# Patient Record
Sex: FEMALE | Race: WHITE | Employment: FULL TIME | URBAN - METROPOLITAN AREA
[De-identification: names, ages, dates, MRNs, and addresses within clinical notes are randomized per-mention and may not be internally consistent; named-entity substitution may affect disease eponyms.]

---

## 2018-02-07 ENCOUNTER — OFFICE VISIT (OUTPATIENT)
Dept: FAMILY MEDICINE | Facility: CLINIC | Age: 58
End: 2018-02-07
Payer: COMMERCIAL

## 2018-02-07 VITALS
HEART RATE: 76 BPM | WEIGHT: 125 LBS | OXYGEN SATURATION: 100 % | DIASTOLIC BLOOD PRESSURE: 76 MMHG | TEMPERATURE: 97.1 F | SYSTOLIC BLOOD PRESSURE: 114 MMHG | RESPIRATION RATE: 18 BRPM
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
## 2018-02-07 PROCEDURE — 99203 OFFICE O/P NEW LOW 30 MIN: CPT | Performed by: FAMILY MEDICINE

## 2018-02-07 NOTE — MR AVS SNAPSHOT
After Visit Summary   2/7/2018    Lizzie Monson    MRN: [id number]           Patient Information     Date Of Birth          1960        Visit Information        Provider Department      2/7/2018 3:00 PM Viridiana Philippe MD [facility]        Today's Diagnoses     Dog bite, initial encounter    -  1      Care Instructions      Animal Bite (General)  An animal bite can cause a wound deep enough to break the skin. In such cases, the wound is cleaned and then sometimes closed. If the wound is closed, it may not be closed completely. This is so that fluid can drain if the wound becomes infected. In addition to wound care, a tetanus shot may be given, if needed.    Home care    Care for the wound as directed. If a dressing was applied to the wound, be sure to change it as directed.    Wash your hands well with soap and warm water before and after caring for the wound. This helps lower the risk of infection.    If the wound bleeds, place a clean, soft cloth on the wound. Then firmly apply pressure until the bleeding stops. This may take up to 5 minutes. Do not release the pressure and look at the wound during this time.    Most skin wounds heal within 10 days. But an infection can occur even with proper treatment. So be sure to watch the wound for signs of infection (see below). Check the wound as often as directed by your healthcare provider.    Antibiotics may be prescribed. These help prevent or treat infection. If you re given antibiotics, take them as directed. Also be sure to complete the medicines.  Rabies prevention  Rabies is a virus that can be carried in certain animals. These can include domestic animals such as dogs and cats. Wild animals such as skunks, raccoons, foxes, and bats can also carry rabies. Pets fully vaccinated against rabies (2 shots) are at very low risk of infection. But because human rabies is almost always fatal, any biting pet should be confined for 10  days as an extra precaution. In general, if there is a risk for rabies, the following steps may need to be taken:    If someone s pet dog or cat has bitten you, it should be kept in a secure area for the next 10 days to watch for signs of illness. (If the pet owner won t allow this, contact your local animal control center.) If the dog or cat becomes ill or dies during that time, contact your local animal control center at once so the animal may be tested for rabies. If the pet stays healthy for the next 10 days, there is no danger of rabies in the animal or you.    If a stray pet bit you, contact your local animal control center. They can give information on capture, quarantine, and animal rabies testing.    If you can t find the animal that bit you in the next 2 days, and if rabies exists in your region, you may need to receive the rabies vaccine series. Call your healthcare provider right away. Or return to the emergency department promptly.    All animal bites should be reported to the local animal control center. If you were not given a form to fill out, you can report this yourself.  Follow-up care  Follow up with your healthcare provider, or as directed.  When to seek medical advice  Call your healthcare provider right away if any of these occur:    Signs of infection:    Spreading redness or warmth from the wound    Increased pain or swelling    Fever of 100.4 F (38 C) or higher, or as directed by your healthcare provider    Colored fluid or pus draining from the wound    Signs of rabies infection:    Headache    Confusion    Strange behavior    Increased salivating and drooling    Seizure    Decreased ability to move any body part near the bite area    Bleeding that can't be stopped after 5 minutes of firm pressure  Date Last Reviewed: 3/1/2017    7688-4806 [employer]. [address] [place] [address], [place] [address]. All rights reserved. This information is not intended as a substitute for  "professional medical care. Always follow your healthcare professional's instructions.                Follow-ups after your visit        Who to contact     If you have questions or need follow up information about today's clinic visit or your schedule please contact [facility] directly at [phone].  Normal or non-critical lab and imaging results will be communicated to you by MyChart, letter or phone within 4 business days after the clinic has received the results. If you do not hear from us within 7 days, please contact the clinic through MyChart or phone. If you have a critical or abnormal lab result, we will notify you by phone as soon as possible.  Submit refill requests through [employer] or call your pharmacy and they will forward the refill request to us. Please allow 3 business days for your refill to be completed.          Additional Information About Your Visit        MyChart Information     [employer]beat lets you send messages to your doctor, view your test results, renew your prescriptions, schedule appointments and more. To sign up, go to [email or web address] . Click on \"Log in\" on the left side of the screen, which will take you to the Welcome page. Then click on \"Sign up Now\" on the right side of the page.     You will be asked to enter the access code listed below, as well as some personal information. Please follow the directions to create your username and password.     Your access code is: [email or web address]  Expires: 2018  3:35 PM     Your access code will  in 90 days. If you need help or a new code, please call your [facility] or [phone].        Care EveryWhere ID     This is your Care EveryWhere ID. This could be used by other organizations to access your [place] medical records  [id number]        Your Vitals Were     Pulse Temperature Respirations Pulse Oximetry          76 97.1  F (36.2  C) (Oral) 18 100%         Blood Pressure from Last 3 Encounters: "   02/07/18 114/76   11/26/12 110/64    Weight from Last 3 Encounters:   02/07/18 125 lb (56.7 kg)   11/26/12 124 lb (56.2 kg)              Today, you had the following     No orders found for display         Today's Medication Changes          These changes are accurate as of 2/7/18  3:35 PM.  If you have any questions, ask your nurse or doctor.               Start taking these medicines.        Dose/Directions    amoxicillin-clavulanate 875-125 MG per tablet   Commonly known as:  AUGMENTIN   Used for:  Dog bite, initial encounter   Started by:  Viridiana Philippe MD        Dose:  1 tablet   Take 1 tablet by mouth 2 times daily for 5 days   Quantity:  10 tablet   Refills:  0            Where to get your medicines      These medications were sent to [facility] [id number] - [place], [place] - [address] [facility]  [place]  [address] [place] [address] [place], [place] [address]    Hours:  24-hours Phone:  [phone]     amoxicillin-clavulanate 875-125 MG per tablet                Primary Care Provider Fax #    Physician No Ref-Primary [phone]       No address on file        Equal Access to Services     SWATI BLUM : Hadii vera manriqueo Soporfirio, waaxda luqadaha, qaybta kaalmada adeegyada, richard almanzar. [facility] [phone].    ATENCIÓN: Si habla español, tiene a gamino disposición servicios gratuitos de asistencia lingüística. Jose al [phone].    We comply with applicable federal civil rights laws and [place] laws. We do not discriminate on the basis of race, color, national origin, age, disability, sex, sexual orientation, or gender identity.            Thank you!     Thank you for choosing [facility]  for your care. Our goal is always to provide you with excellent care. Hearing back from our patients is one way we can continue to improve our services. Please take a few minutes to complete the written survey that you may receive in the mail after  your visit with us. Thank you!             Your Updated Medication List - Protect others around you: Learn how to safely use, store and throw away your medicines at www.disposemymeds.org.          This list is accurate as of 2/7/18  3:35 PM.  Always use your most recent med list.                   Brand Name Dispense Instructions for use Diagnosis    amoxicillin-clavulanate 875-125 MG per tablet    AUGMENTIN    10 tablet    Take 1 tablet by mouth 2 times daily for 5 days    Dog bite, initial encounter       azithromycin 250 MG tablet    ZITHROMAX    6 tablet    Two tablets first day, then one tablet daily for four days.    Maxillary sinusitis       fluticasone 50 MCG/ACT spray    FLONASE    1 Package    Spray 1-2 sprays into both nostrils daily.    Maxillary sinusitis       OMEPRAZOLE PO      Take 1 tablet by mouth daily.

## 2018-02-07 NOTE — PATIENT INSTRUCTIONS
Animal Bite (General)  An animal bite can cause a wound deep enough to break the skin. In such cases, the wound is cleaned and then sometimes closed. If the wound is closed, it may not be closed completely. This is so that fluid can drain if the wound becomes infected. In addition to wound care, a tetanus shot may be given, if needed.    Home care    Care for the wound as directed. If a dressing was applied to the wound, be sure to change it as directed.    Wash your hands well with soap and warm water before and after caring for the wound. This helps lower the risk of infection.    If the wound bleeds, place a clean, soft cloth on the wound. Then firmly apply pressure until the bleeding stops. This may take up to 5 minutes. Do not release the pressure and look at the wound during this time.    Most skin wounds heal within 10 days. But an infection can occur even with proper treatment. So be sure to watch the wound for signs of infection (see below). Check the wound as often as directed by your healthcare provider.    Antibiotics may be prescribed. These help prevent or treat infection. If you re given antibiotics, take them as directed. Also be sure to complete the medicines.  Rabies prevention  Rabies is a virus that can be carried in certain animals. These can include domestic animals such as dogs and cats. Wild animals such as skunks, raccoons, foxes, and bats can also carry rabies. Pets fully vaccinated against rabies (2 shots) are at very low risk of infection. But because human rabies is almost always fatal, any biting pet should be confined for 10 days as an extra precaution. In general, if there is a risk for rabies, the following steps may need to be taken:    If someone s pet dog or cat has bitten you, it should be kept in a secure area for the next 10 days to watch for signs of illness. (If the pet owner won t allow this, contact your local animal control center.) If the dog or cat becomes ill or dies  during that time, contact your local animal control center at once so the animal may be tested for rabies. If the pet stays healthy for the next 10 days, there is no danger of rabies in the animal or you.    If a stray pet bit you, contact your local animal control center. They can give information on capture, quarantine, and animal rabies testing.    If you can t find the animal that bit you in the next 2 days, and if rabies exists in your region, you may need to receive the rabies vaccine series. Call your healthcare provider right away. Or return to the emergency department promptly.    All animal bites should be reported to the local animal control center. If you were not given a form to fill out, you can report this yourself.  Follow-up care  Follow up with your healthcare provider, or as directed.  When to seek medical advice  Call your healthcare provider right away if any of these occur:    Signs of infection:    Spreading redness or warmth from the wound    Increased pain or swelling    Fever of 100.4 F (38 C) or higher, or as directed by your healthcare provider    Colored fluid or pus draining from the wound    Signs of rabies infection:    Headache    Confusion    Strange behavior    Increased salivating and drooling    Seizure    Decreased ability to move any body part near the bite area    Bleeding that can't be stopped after 5 minutes of firm pressure  Date Last Reviewed: 3/1/2017    0535-4297 [employer]. [address] [place] [address], [place] [address]. All rights reserved. This information is not intended as a substitute for professional medical care. Always follow your healthcare professional's instructions.

## 2018-02-07 NOTE — PROGRESS NOTES
HPI      ROS      Physical Exam      SUBJECTIVE:   Lizzie Monson is a 57 year old female who presents to clinic today for the following health issues:    Dog Bite      Duration: today     Description (location/character/radiation): Dog bit pt left leg     Intensity:  mild    Accompanying signs and symptoms: Pt [place] today and talking to the owner of the dog and dog bit pt. A little swelling and bleeding.    History (similar episodes/previous evaluation): None    Precipitating or alleviating factors: None    Therapies tried and outcome: None   tdap up to date 2017  No numbness  The dog was up to date on its shots per the owner, who was not very surprised that it bit her.    She is otherwise in her usual state of good health.         Problem list and histories reviewed & adjusted, as indicated.  Additional history: as documented    Patient Active Problem List   Diagnosis     CARDIOVASCULAR SCREENING; LDL GOAL LESS THAN 160     Low back pain     Past Surgical History:   Procedure Laterality Date     HERNIORRHAPHY INGUINAL      right       Social History   Substance Use Topics     Smoking status: Never Smoker     Smokeless tobacco: Never Used     Alcohol use Yes     Family History   Problem Relation Age of Onset     Family History Negative No family hx of          Current Outpatient Prescriptions   Medication Sig Dispense Refill   none  Allergies   Allergen Reactions     Sulfa Drugs Hives       Reviewed and updated as needed this visit by clinical staff  Tobacco  Allergies  Meds  Med Hx  Surg Hx  Fam Hx  Soc Hx      Reviewed and updated as needed this visit by Provider         ROS:      OBJECTIVE:     /76 (BP Location: Left arm, Patient Position: Sitting, Cuff Size: Adult Regular)  Pulse 76  Temp 97.1  F (36.2  C) (Oral)  Resp 18  Wt 125 lb (56.7 kg)  SpO2 100%  There is no height or weight on file to calculate BMI.  GENERAL APPEARANCE: healthy, alert and no distress  RESP: lungs clear  to auscultation - no rales, rhonchi or wheezes  CV: regular rates and rhythm, normal S1 S2, no S3 or S4 and no murmur, click or rub  SKIN: superficial abrasion to left lateral calf with three small round dark eccymoses but no punctures; the wound appears clean, no surrounding erythema    Diagnostic Test Results:  none     ASSESSMENT/PLAN:       1. Dog bite, initial encounter  The wound was cleaned and inspected, appears to be superficial, but will prescribe prophylactic antibiotic, reviewed to f/u if fever, erythema, exudate, or any other symptoms of concern.  - amoxicillin-clavulanate (AUGMENTIN) 875-125 MG per tablet; Take 1 tablet by mouth 2 times daily for 5 days  Dispense: 10 tablet; Refill: 0        Viridiana Philippe MD  [facility]

## 2021-05-17 ENCOUNTER — ANCILLARY PROCEDURE (OUTPATIENT)
Dept: GENERAL RADIOLOGY | Facility: CLINIC | Age: 61
End: 2021-05-17
Attending: PHYSICIAN ASSISTANT
Payer: COMMERCIAL

## 2021-05-17 ENCOUNTER — OFFICE VISIT (OUTPATIENT)
Dept: URGENT CARE | Facility: URGENT CARE | Age: 61
End: 2021-05-17
Payer: COMMERCIAL

## 2021-05-17 VITALS
SYSTOLIC BLOOD PRESSURE: 124 MMHG | WEIGHT: 120 LBS | HEART RATE: 60 BPM | RESPIRATION RATE: 14 BRPM | TEMPERATURE: 97.1 F | DIASTOLIC BLOOD PRESSURE: 70 MMHG | HEIGHT: 66 IN | BODY MASS INDEX: 19.29 KG/M2
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
## 2021-05-17 DIAGNOSIS — S59.901A INJURY OF RIGHT ELBOW, INITIAL ENCOUNTER: ICD-10-CM

## 2021-05-17 DIAGNOSIS — T07.XXXA ABRASIONS OF MULTIPLE SITES: ICD-10-CM

## 2021-05-17 PROCEDURE — 73080 X-RAY EXAM OF ELBOW: CPT | Mod: RT | Performed by: RADIOLOGY

## 2021-05-17 PROCEDURE — 99204 OFFICE O/P NEW MOD 45 MIN: CPT | Performed by: PHYSICIAN ASSISTANT

## 2021-05-17 PROCEDURE — 71101 X-RAY EXAM UNILAT RIBS/CHEST: CPT | Mod: RT | Performed by: RADIOLOGY

## 2021-05-17 RX ORDER — CEPHALEXIN 500 MG/1
500 CAPSULE ORAL 4 TIMES DAILY
Qty: 28 CAPSULE | Refills: 0 | Status: SHIPPED | OUTPATIENT
Start: 2021-05-17 | End: 2021-05-24

## 2021-05-17 RX ORDER — CEPHALEXIN 500 MG/1
CAPSULE ORAL
COMMUNITY
Start: 2021-05-10

## 2021-05-17 RX ORDER — IBUPROFEN 600 MG/1
600 TABLET, FILM COATED ORAL EVERY 6 HOURS PRN
Qty: 120 TABLET | Refills: 0 | Status: SHIPPED | OUTPATIENT
Start: 2021-05-17

## 2021-05-17 ASSESSMENT — MIFFLIN-ST. JEOR: SCORE: 1141.07

## 2021-05-17 ASSESSMENT — ENCOUNTER SYMPTOMS
EYES NEGATIVE: 1
NEUROLOGICAL NEGATIVE: 1
CARDIOVASCULAR NEGATIVE: 1
RESPIRATORY NEGATIVE: 1
CONSTITUTIONAL NEGATIVE: 1
WOUND: 1
PSYCHIATRIC NEGATIVE: 1
GASTROINTESTINAL NEGATIVE: 1
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
## 2021-05-17 NOTE — PATIENT INSTRUCTIONS
Patient Education     Abrasions  Abrasions are skin scrapes. Their treatment depends on how large and deep the abrasion is.   Home care  You may be prescribed an antibiotic cream or ointment to apply to the wound. This helps prevent infection. Follow instructions when using this medicine.   General care    To care for the abrasion, do the following each day for as long as directed by your healthcare provider:  ? If you were given a bandage, change it once a day. If your bandage sticks to the wound, soak it in warm water until it loosens.  ? Wash the area with soap and warm water. You may do this in a sink or under a tub faucet or shower. Rinse off the soap. Then pat the area dry with a clean towel.  ? If antibiotic ointment or cream was prescribed, reapply it to the wound as directed. Cover the wound with a fresh nonstick bandage. If the bandage becomes wet or dirty, change it as soon as possible.  ? Some antibiotic ointments or cream can cause an allergic reaction or dermatitis. This may cause redness, itching and or hives. If this occurs, stop using the ointment right away and wash off any remaining ointment. You may need to take some allergy medicine to relieve symptoms.    You may use acetaminophen or ibuprofen to control pain unless another pain medicine was prescribed. Talk with your healthcare provider before using these medicines if you have chronic liver or kidney disease or ever had a stomach ulcer or GI (gastrointestinal) bleeding. Don t use ibuprofen in children younger than 6 months old.    Most skin wounds heal within 10 days. But an infection may occur even with treatment. So it s important to watch the wound for signs of infection as listed below.    Follow-up care  Follow up with your healthcare provider, or as advised.  When to get medical advice  Call your healthcare provider right away if any of these occur:    Fever of 100.4 F (38 C) or higher, or as directed by your healthcare  provider    Increasing pain, redness, swelling, or drainage from the wound    Bleeding from the wound that does not stop after a few minutes of steady, firm pressure    Decreased ability to move any body part near the wound  [employer] last reviewed this educational content on 8/1/2019 2000-2021 [employer]. All rights reserved. This information is not intended as a substitute for professional medical care. Always follow your healthcare professional's instructions.           Patient Education     RICE     Rest an injury, elevate it, and use ice and compression as directed.   RICE stands for rest, ice, compression, and elevation. These can limit pain and swelling after an injury. RICE may be recommended to help treat breaks (fractures), sprains, strains, and bruises or bumps.   Home care  Here are the details of RICE:    Rest. Limit the use of the injured body part. This helps prevent further damage to the body part and gives it time to heal. In some cases, you may need a sling, brace, splint, or cast to help keep the body part still until it has healed.    Ice. Applying ice right after an injury helps relieve pain and swelling. To make an ice pack, put ice cubes in a plastic bag that seals at the top. Wrap the bag in a clean, thin towel or cloth. Then place it over the injured area. Do this for 10 to 15 minutes every 3 to 4 hours. Continue for the next 1 to 3 days or until your symptoms improve. Never put ice directly on your skin. Don't ice an area longer than 15 minutes at a time.    Compression. Putting pressure on an injury helps reduce swelling and provides support. Wrap the injured area firmly with an elastic bandage or wrap. Make sure not to wrap the bandage too tightly or you will cut off blood flow to the injured area. If your bandage loosens, rewrap it.    Elevation. Keeping an injury raised or elevated above the level of your heart reduces swelling, pain, and throbbing. For instance, if you  have a broken leg, it may help to rest your leg on several pillows when sitting or lying down. Try to keep the injured area elevated as often as possible.  Follow-up care  Follow up with your healthcare provider, or as advised.  When to seek medical advice  Call your healthcare provider right away if any of these occur:    Fever of 100.4 F (38 C) or higher, or as directed by your healthcare provider    Chills    Increased pain or swelling in the injured body part    Injured body part becomes cold, blue, numb, or tingly    Signs of infection. These include warmth in the skin, redness, drainage, or bad smell coming from the injured body part.  [employer] last reviewed this educational content on 6/1/2018 2000-2021 [employer]. All rights reserved. This information is not intended as a substitute for professional medical care. Always follow your healthcare professional's instructions.

## 2024-04-17 ENCOUNTER — LAB REQUISITION (OUTPATIENT)
Dept: LAB | Facility: CLINIC | Age: 64
End: 2024-04-17
Payer: COMMERCIAL

## 2024-04-17 DIAGNOSIS — D48.5 NEOPLASM OF UNCERTAIN BEHAVIOR OF SKIN: ICD-10-CM

## 2024-04-17 PROCEDURE — 88305 TISSUE EXAM BY PATHOLOGIST: CPT | Mod: TC,ORL | Performed by: DERMATOLOGY

## 2024-04-17 PROCEDURE — 88305 TISSUE EXAM BY PATHOLOGIST: CPT | Mod: 26 | Performed by: DERMATOLOGY

## 2024-04-19 LAB
PATH REPORT.COMMENTS IMP SPEC: NORMAL
PATH REPORT.COMMENTS IMP SPEC: NORMAL
PATH REPORT.FINAL DX SPEC: NORMAL
PATH REPORT.GROSS SPEC: NORMAL
PATH REPORT.MICROSCOPIC SPEC OTHER STN: NORMAL
PATH REPORT.RELEVANT HX SPEC: NORMAL

## 2024-10-10 ENCOUNTER — LAB REQUISITION (OUTPATIENT)
Dept: LAB | Facility: CLINIC | Age: 64
End: 2024-10-10
Payer: COMMERCIAL

## 2024-10-10 DIAGNOSIS — D48.5 NEOPLASM OF UNCERTAIN BEHAVIOR OF SKIN: ICD-10-CM

## 2024-10-10 PROCEDURE — 88305 TISSUE EXAM BY PATHOLOGIST: CPT | Mod: 26 | Performed by: DERMATOLOGY

## 2024-10-10 PROCEDURE — 88305 TISSUE EXAM BY PATHOLOGIST: CPT | Mod: TC,ORL | Performed by: DERMATOLOGY

## 2025-05-14 ENCOUNTER — LAB REQUISITION (OUTPATIENT)
Dept: LAB | Facility: CLINIC | Age: 65
End: 2025-05-14
Payer: COMMERCIAL

## 2025-05-14 DIAGNOSIS — D48.5 NEOPLASM OF UNCERTAIN BEHAVIOR OF SKIN: ICD-10-CM

## 2025-05-14 PROCEDURE — 88305 TISSUE EXAM BY PATHOLOGIST: CPT | Mod: 26 | Performed by: DERMATOLOGY

## 2025-05-14 PROCEDURE — 88305 TISSUE EXAM BY PATHOLOGIST: CPT | Mod: TC,ORL | Performed by: DERMATOLOGY
